# Patient Record
Sex: FEMALE | Race: WHITE | NOT HISPANIC OR LATINO
[De-identification: names, ages, dates, MRNs, and addresses within clinical notes are randomized per-mention and may not be internally consistent; named-entity substitution may affect disease eponyms.]

---

## 2017-05-09 ENCOUNTER — FORM ENCOUNTER (OUTPATIENT)
Age: 62
End: 2017-05-09

## 2018-05-15 ENCOUNTER — FORM ENCOUNTER (OUTPATIENT)
Age: 63
End: 2018-05-15

## 2019-05-28 ENCOUNTER — FORM ENCOUNTER (OUTPATIENT)
Age: 64
End: 2019-05-28

## 2020-10-06 ENCOUNTER — FORM ENCOUNTER (OUTPATIENT)
Age: 65
End: 2020-10-06

## 2020-10-13 ENCOUNTER — FORM ENCOUNTER (OUTPATIENT)
Age: 65
End: 2020-10-13

## 2021-04-27 PROBLEM — Z00.00 ENCOUNTER FOR PREVENTIVE HEALTH EXAMINATION: Status: ACTIVE | Noted: 2021-04-27

## 2021-05-03 ENCOUNTER — APPOINTMENT (OUTPATIENT)
Dept: BREAST CENTER | Facility: CLINIC | Age: 66
End: 2021-05-03

## 2021-05-03 ENCOUNTER — NON-APPOINTMENT (OUTPATIENT)
Age: 66
End: 2021-05-03

## 2021-05-13 ENCOUNTER — FORM ENCOUNTER (OUTPATIENT)
Age: 66
End: 2021-05-13

## 2021-05-26 ENCOUNTER — NON-APPOINTMENT (OUTPATIENT)
Age: 66
End: 2021-05-26

## 2021-12-02 ENCOUNTER — APPOINTMENT (OUTPATIENT)
Dept: BREAST CENTER | Facility: CLINIC | Age: 66
End: 2021-12-02
Payer: MEDICARE

## 2021-12-02 VITALS
SYSTOLIC BLOOD PRESSURE: 111 MMHG | BODY MASS INDEX: 21.82 KG/M2 | HEIGHT: 67 IN | HEART RATE: 59 BPM | WEIGHT: 139 LBS | DIASTOLIC BLOOD PRESSURE: 70 MMHG

## 2021-12-02 DIAGNOSIS — Z78.9 OTHER SPECIFIED HEALTH STATUS: ICD-10-CM

## 2021-12-02 DIAGNOSIS — Z72.89 OTHER PROBLEMS RELATED TO LIFESTYLE: ICD-10-CM

## 2021-12-02 PROCEDURE — 99214 OFFICE O/P EST MOD 30 MIN: CPT

## 2021-12-02 RX ORDER — ESTRADIOL 10 UG/1
INSERT VAGINAL
Refills: 0 | Status: ACTIVE | COMMUNITY

## 2023-05-26 ENCOUNTER — NON-APPOINTMENT (OUTPATIENT)
Age: 68
End: 2023-05-26

## 2023-08-18 ENCOUNTER — APPOINTMENT (OUTPATIENT)
Dept: BREAST CENTER | Facility: CLINIC | Age: 68
End: 2023-08-18

## 2023-09-01 ENCOUNTER — APPOINTMENT (OUTPATIENT)
Dept: BREAST CENTER | Facility: CLINIC | Age: 68
End: 2023-09-01
Payer: MEDICARE

## 2023-09-01 VITALS
HEART RATE: 60 BPM | HEIGHT: 67 IN | BODY MASS INDEX: 21.35 KG/M2 | SYSTOLIC BLOOD PRESSURE: 104 MMHG | DIASTOLIC BLOOD PRESSURE: 68 MMHG | WEIGHT: 136 LBS

## 2023-09-01 DIAGNOSIS — Z78.9 OTHER SPECIFIED HEALTH STATUS: ICD-10-CM

## 2023-09-01 DIAGNOSIS — Z13.71 ENCOUNTER FOR NONPROCREATIVE SCREENING FOR GENETIC DISEASE CARRIER STATUS: ICD-10-CM

## 2023-09-01 DIAGNOSIS — Z80.3 FAMILY HISTORY OF MALIGNANT NEOPLASM OF BREAST: ICD-10-CM

## 2023-09-01 DIAGNOSIS — Z12.39 ENCOUNTER FOR OTHER SCREENING FOR MALIGNANT NEOPLASM OF BREAST: ICD-10-CM

## 2023-09-01 PROCEDURE — 99213 OFFICE O/P EST LOW 20 MIN: CPT

## 2023-09-01 RX ORDER — ESTRADIOL 0.04 MG/D
0.04 PATCH, EXTENDED RELEASE TRANSDERMAL
Refills: 0 | Status: DISCONTINUED | COMMUNITY
End: 2023-09-01

## 2023-09-01 NOTE — PHYSICAL EXAM
[Normocephalic] : normocephalic [EOMI] : extra ocular movement intact [Supple] : supple [No Supraclavicular Adenopathy] : no supraclavicular adenopathy [de-identified] : Bilateral breast/axilla/supraclavicular area: No masses, discharge, or adenopathy\par

## 2023-09-01 NOTE — PAST MEDICAL HISTORY
[Postmenopausal] : The patient is postmenopausal [Menarche Age ____] : age at menarche was [unfilled] [Menopause Age____] : age at menopause was [unfilled] [Total Preg ___] : G[unfilled] [Live Births ___] : P[unfilled]  [Abortions ___] : Abortions:[unfilled] [Age At Live Birth ___] : Age at live birth: [unfilled] [History of Hormone Replacement Treatment] : has no history of hormone replacement treatment [de-identified] : hysterectomy  [FreeTextEntry6] : n/a [FreeTextEntry7] : IUD   [FreeTextEntry8] : yes - 8 month

## 2023-09-01 NOTE — HISTORY OF PRESENT ILLNESS
[FreeTextEntry1] : Patient is a 67yo F patient here for breast cancer screening (previously seen by Dr. Ashley/Dr. Rondon). She is followed for fhx of breast cancer in mother, age 36. Patient is BRCA/panel negative (tested in 2021). Patient denies palpable masses, skin changes, or nipple discharge bilaterally. Of note, patient has previously declined high risk MRI imaging. She also has hx of 10-15years of HRT use due to postmenopausal symptoms. 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  DANIELLE Lifetime Risk- 26.2%  5/10/17: B/l MG- extremely dense, stable benign calcs jj. BIRADS 2. 5/16/18: B/l MG- stable. BIRADS 2. 5/29/19: B/L MG & US- stable; US - [facility]. BIRADS 2. 10/7/20: B/L MG & US- stable; US - [facility]. BIRADS 2. 12/2/21: B/L MG & US- extremely dense. BI-RADS 1. 12/1/21: B/l MG & US- extremely dense. [facility]. BI-RADS 1 9/1/23: B/l MG & US- heterogeneously dense. [facility]. BI-RADS 1

## 2024-09-06 PROBLEM — R92.30 DENSE BREASTS: Status: ACTIVE | Noted: 2024-09-06

## 2024-09-11 ENCOUNTER — APPOINTMENT (OUTPATIENT)
Dept: BREAST CENTER | Facility: CLINIC | Age: 69
End: 2024-09-11
Payer: MEDICARE

## 2024-09-11 VITALS
WEIGHT: 134 LBS | SYSTOLIC BLOOD PRESSURE: 121 MMHG | HEART RATE: 67 BPM | BODY MASS INDEX: 21.03 KG/M2 | DIASTOLIC BLOOD PRESSURE: 79 MMHG | HEIGHT: 67 IN

## 2024-09-11 DIAGNOSIS — R92.30 DENSE BREASTS, UNSPECIFIED: ICD-10-CM

## 2024-09-11 DIAGNOSIS — R92.8 OTHER ABNORMAL AND INCONCLUSIVE FINDINGS ON DIAGNOSTIC IMAGING OF BREAST: ICD-10-CM

## 2024-09-11 DIAGNOSIS — Z12.39 ENCOUNTER FOR OTHER SCREENING FOR MALIGNANT NEOPLASM OF BREAST: ICD-10-CM

## 2024-09-11 DIAGNOSIS — Z80.3 FAMILY HISTORY OF MALIGNANT NEOPLASM OF BREAST: ICD-10-CM

## 2024-09-11 PROCEDURE — 99214 OFFICE O/P EST MOD 30 MIN: CPT

## 2024-09-12 PROBLEM — R92.8 ABNORMAL FINDING ON BREAST IMAGING: Status: ACTIVE | Noted: 2024-09-12

## 2024-09-18 NOTE — PAST MEDICAL HISTORY
[Postmenopausal] : The patient is postmenopausal [Menarche Age ____] : age at menarche was [unfilled] [Menopause Age____] : age at menopause was [unfilled] [Total Preg ___] : G[unfilled] [Live Births ___] : P[unfilled]  [Abortions ___] : Abortions:[unfilled] [Age At Live Birth ___] : Age at live birth: [unfilled] [History of Hormone Replacement Treatment] : has no history of hormone replacement treatment [de-identified] : hysterectomy  [FreeTextEntry6] : n/a [FreeTextEntry7] : IUD   [FreeTextEntry8] : yes - 8 month

## 2024-09-18 NOTE — PHYSICAL EXAM
[Normocephalic] : normocephalic [EOMI] : extra ocular movement intact [Supple] : supple [No Supraclavicular Adenopathy] : no supraclavicular adenopathy [de-identified] : Bilateral breast/axilla/supraclavicular area: No masses, discharge, or adenopathy\par

## 2024-09-18 NOTE — PAST MEDICAL HISTORY
[Postmenopausal] : The patient is postmenopausal [Menarche Age ____] : age at menarche was [unfilled] [Menopause Age____] : age at menopause was [unfilled] [Total Preg ___] : G[unfilled] [Live Births ___] : P[unfilled]  [Abortions ___] : Abortions:[unfilled] [Age At Live Birth ___] : Age at live birth: [unfilled] [History of Hormone Replacement Treatment] : has no history of hormone replacement treatment [de-identified] : hysterectomy  [FreeTextEntry6] : n/a [FreeTextEntry7] : IUD   [FreeTextEntry8] : yes - 8 month

## 2024-09-18 NOTE — PHYSICAL EXAM
[Normocephalic] : normocephalic [EOMI] : extra ocular movement intact [Supple] : supple [No Supraclavicular Adenopathy] : no supraclavicular adenopathy [de-identified] : Bilateral breast/axilla/supraclavicular area: No masses, discharge, or adenopathy\par

## 2024-09-18 NOTE — PAST MEDICAL HISTORY
[Postmenopausal] : The patient is postmenopausal [Menarche Age ____] : age at menarche was [unfilled] [Menopause Age____] : age at menopause was [unfilled] [Total Preg ___] : G[unfilled] [Live Births ___] : P[unfilled]  [Abortions ___] : Abortions:[unfilled] [Age At Live Birth ___] : Age at live birth: [unfilled] [History of Hormone Replacement Treatment] : has no history of hormone replacement treatment [de-identified] : hysterectomy  [FreeTextEntry6] : n/a [FreeTextEntry7] : IUD   [FreeTextEntry8] : yes - 8 month

## 2024-09-18 NOTE — PHYSICAL EXAM
[Normocephalic] : normocephalic [EOMI] : extra ocular movement intact [Supple] : supple [No Supraclavicular Adenopathy] : no supraclavicular adenopathy [de-identified] : Bilateral breast/axilla/supraclavicular area: No masses, discharge, or adenopathy\par

## 2024-09-18 NOTE — HISTORY OF PRESENT ILLNESS
[FreeTextEntry1] : Patient is a 68yo F patient here for breast cancer screening (previously seen by Dr. Ashley/Dr. Rondon). She is followed for fhx of breast cancer in mother, age 36 and sister with breast cancer, dx age 72. Patient is BRCA/panel negative (tested in 2021). Patient denies palpable masses, skin changes, or nipple discharge bilaterally. Of note, patient has previously declined high risk MRI imaging. She also has hx of 10-15years of HRT use due to postmenopausal symptoms. 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  DANIELLE Lifetime Risk- 26.2%  5/10/17: B/l MG- extremely dense, stable benign calcs jj. BIRADS 2. 5/16/18: B/l MG- stable. BIRADS 2. 5/29/19: B/L MG & US- stable; US - [facility]. BIRADS 2. 10/7/20: B/L MG & US- stable; US - [facility]. BIRADS 2. 12/2/21: B/L MG & US- extremely dense. BI-RADS 1. 12/1/21: B/l MG & US- extremely dense. [facility]. BI-RADS 1 9/1/23: B/l MG & US- heterogeneously dense. [facility]. BI-RADS 1 9/11/24: B/l MG/US: MG-heterogeneously dense.  B/L scattered benign-appearing calcs.  L lower outer posterior 1.4 cm FN partially imaged asymmetry (pliable tissue does not efface on diagnostic views.  No persistent mass or distortion present.  No evidence of disease on ultrasound.  (Rec 6-month L DX MG).  BI-RADS 3.

## 2024-09-18 NOTE — PAST MEDICAL HISTORY
[Postmenopausal] : The patient is postmenopausal [Menarche Age ____] : age at menarche was [unfilled] [Menopause Age____] : age at menopause was [unfilled] [Total Preg ___] : G[unfilled] [Live Births ___] : P[unfilled]  [Abortions ___] : Abortions:[unfilled] [Age At Live Birth ___] : Age at live birth: [unfilled] [History of Hormone Replacement Treatment] : has no history of hormone replacement treatment [de-identified] : hysterectomy  [FreeTextEntry6] : n/a [FreeTextEntry7] : IUD   [FreeTextEntry8] : yes - 8 month

## 2024-09-18 NOTE — HISTORY OF PRESENT ILLNESS
[FreeTextEntry1] : Patient is a 70yo F patient here for breast cancer screening (previously seen by Dr. Ashley/Dr. Rondon). She is followed for fhx of breast cancer in mother, age 36 and sister with breast cancer, dx age 72. Patient is BRCA/panel negative (tested in 2021). Patient denies palpable masses, skin changes, or nipple discharge bilaterally. Of note, patient has previously declined high risk MRI imaging. She also has hx of 10-15years of HRT use due to postmenopausal symptoms. 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  DANIELLE Lifetime Risk- 26.2%  5/10/17: B/l MG- extremely dense, stable benign calcs jj. BIRADS 2. 5/16/18: B/l MG- stable. BIRADS 2. 5/29/19: B/L MG & US- stable; US - [facility]. BIRADS 2. 10/7/20: B/L MG & US- stable; US - [facility]. BIRADS 2. 12/2/21: B/L MG & US- extremely dense. BI-RADS 1. 12/1/21: B/l MG & US- extremely dense. [facility]. BI-RADS 1 9/1/23: B/l MG & US- heterogeneously dense. [facility]. BI-RADS 1 9/11/24: B/l MG/US: MG-heterogeneously dense.  B/L scattered benign-appearing calcs.  L lower outer posterior 1.4 cm FN partially imaged asymmetry (pliable tissue does not efface on diagnostic views.  No persistent mass or distortion present.  No evidence of disease on ultrasound.  (Rec 6-month L DX MG).  BI-RADS 3.

## 2024-09-18 NOTE — PHYSICAL EXAM
[Normocephalic] : normocephalic [EOMI] : extra ocular movement intact [Supple] : supple [No Supraclavicular Adenopathy] : no supraclavicular adenopathy [de-identified] : Bilateral breast/axilla/supraclavicular area: No masses, discharge, or adenopathy\par

## 2024-10-11 ENCOUNTER — NON-APPOINTMENT (OUTPATIENT)
Age: 69
End: 2024-10-11

## 2024-10-30 ENCOUNTER — NON-APPOINTMENT (OUTPATIENT)
Age: 69
End: 2024-10-30

## 2024-12-25 PROBLEM — F10.90 ALCOHOL USE: Status: ACTIVE | Noted: 2021-12-02

## 2025-09-12 PROBLEM — Z12.39 BREAST CANCER SCREENING, HIGH RISK PATIENT: Status: ACTIVE | Noted: 2025-09-12

## 2025-09-17 ENCOUNTER — APPOINTMENT (OUTPATIENT)
Dept: BREAST CENTER | Facility: CLINIC | Age: 70
End: 2025-09-17

## 2025-09-17 DIAGNOSIS — Z12.39 ENCOUNTER FOR OTHER SCREENING FOR MALIGNANT NEOPLASM OF BREAST: ICD-10-CM
